# Patient Record
Sex: FEMALE | Race: WHITE | NOT HISPANIC OR LATINO | Employment: FULL TIME | URBAN - METROPOLITAN AREA
[De-identification: names, ages, dates, MRNs, and addresses within clinical notes are randomized per-mention and may not be internally consistent; named-entity substitution may affect disease eponyms.]

---

## 2021-12-10 ENCOUNTER — CLINICAL SUPPORT (OUTPATIENT)
Dept: URGENT CARE | Facility: CLINIC | Age: 31
End: 2021-12-10
Payer: COMMERCIAL

## 2021-12-10 DIAGNOSIS — Z20.822 COVID-19 RULED OUT: Primary | ICD-10-CM

## 2021-12-10 LAB
CTP QC/QA: YES
SARS-COV-2 RDRP RESP QL NAA+PROBE: NEGATIVE

## 2021-12-10 PROCEDURE — U0002 COVID-19 LAB TEST NON-CDC: HCPCS | Mod: QW,S$GLB,, | Performed by: EMERGENCY MEDICINE

## 2021-12-10 PROCEDURE — 99211 OFF/OP EST MAY X REQ PHY/QHP: CPT | Mod: S$GLB,,, | Performed by: EMERGENCY MEDICINE

## 2021-12-10 PROCEDURE — U0002: ICD-10-PCS | Mod: QW,S$GLB,, | Performed by: EMERGENCY MEDICINE

## 2021-12-10 PROCEDURE — 99211 PR OFFICE/OUTPT VISIT, EST, LEVL I: ICD-10-PCS | Mod: S$GLB,,, | Performed by: EMERGENCY MEDICINE

## 2021-12-14 ENCOUNTER — CLINICAL SUPPORT (OUTPATIENT)
Dept: URGENT CARE | Facility: CLINIC | Age: 31
End: 2021-12-14
Payer: COMMERCIAL

## 2021-12-14 ENCOUNTER — CLINICAL SUPPORT (OUTPATIENT)
Dept: URGENT CARE | Facility: CLINIC | Age: 31
End: 2021-12-14

## 2021-12-14 DIAGNOSIS — Z11.52 ENCOUNTER FOR SCREENING FOR SEVERE ACUTE RESPIRATORY SYNDROME CORONAVIRUS 2 (SARS-COV-2) INFECTION: Primary | ICD-10-CM

## 2021-12-14 LAB
CTP QC/QA: YES
SARS-COV-2 RDRP RESP QL NAA+PROBE: NEGATIVE

## 2021-12-14 PROCEDURE — U0002: ICD-10-PCS | Mod: QW,S$GLB,, | Performed by: NURSE PRACTITIONER

## 2021-12-14 PROCEDURE — 99211 OFF/OP EST MAY X REQ PHY/QHP: CPT | Mod: S$GLB,,, | Performed by: NURSE PRACTITIONER

## 2021-12-14 PROCEDURE — 99211 PR OFFICE/OUTPT VISIT, EST, LEVL I: ICD-10-PCS | Mod: S$GLB,,, | Performed by: NURSE PRACTITIONER

## 2021-12-14 PROCEDURE — U0002 COVID-19 LAB TEST NON-CDC: HCPCS | Mod: QW,S$GLB,, | Performed by: NURSE PRACTITIONER

## 2021-12-27 ENCOUNTER — OFFICE VISIT (OUTPATIENT)
Dept: URGENT CARE | Facility: CLINIC | Age: 31
End: 2021-12-27
Payer: COMMERCIAL

## 2021-12-27 VITALS
SYSTOLIC BLOOD PRESSURE: 106 MMHG | WEIGHT: 182 LBS | OXYGEN SATURATION: 97 % | RESPIRATION RATE: 18 BRPM | DIASTOLIC BLOOD PRESSURE: 68 MMHG | HEIGHT: 67 IN | TEMPERATURE: 99 F | BODY MASS INDEX: 28.56 KG/M2 | HEART RATE: 79 BPM

## 2021-12-27 DIAGNOSIS — Z20.822 CLOSE EXPOSURE TO COVID-19 VIRUS: Primary | ICD-10-CM

## 2021-12-27 DIAGNOSIS — R53.83 FATIGUE, UNSPECIFIED TYPE: ICD-10-CM

## 2021-12-27 LAB
CTP QC/QA: YES
SARS-COV-2 RDRP RESP QL NAA+PROBE: NEGATIVE

## 2021-12-27 PROCEDURE — 3078F PR MOST RECENT DIASTOLIC BLOOD PRESSURE < 80 MM HG: ICD-10-PCS | Mod: CPTII,S$GLB,, | Performed by: PHYSICIAN ASSISTANT

## 2021-12-27 PROCEDURE — 3074F SYST BP LT 130 MM HG: CPT | Mod: CPTII,S$GLB,, | Performed by: PHYSICIAN ASSISTANT

## 2021-12-27 PROCEDURE — 3078F DIAST BP <80 MM HG: CPT | Mod: CPTII,S$GLB,, | Performed by: PHYSICIAN ASSISTANT

## 2021-12-27 PROCEDURE — 3008F PR BODY MASS INDEX (BMI) DOCUMENTED: ICD-10-PCS | Mod: CPTII,S$GLB,, | Performed by: PHYSICIAN ASSISTANT

## 2021-12-27 PROCEDURE — 99213 PR OFFICE/OUTPT VISIT, EST, LEVL III, 20-29 MIN: ICD-10-PCS | Mod: S$GLB,,, | Performed by: PHYSICIAN ASSISTANT

## 2021-12-27 PROCEDURE — U0002 COVID-19 LAB TEST NON-CDC: HCPCS | Mod: QW,S$GLB,, | Performed by: PHYSICIAN ASSISTANT

## 2021-12-27 PROCEDURE — 99213 OFFICE O/P EST LOW 20 MIN: CPT | Mod: S$GLB,,, | Performed by: PHYSICIAN ASSISTANT

## 2021-12-27 PROCEDURE — 3074F PR MOST RECENT SYSTOLIC BLOOD PRESSURE < 130 MM HG: ICD-10-PCS | Mod: CPTII,S$GLB,, | Performed by: PHYSICIAN ASSISTANT

## 2021-12-27 PROCEDURE — U0002: ICD-10-PCS | Mod: QW,S$GLB,, | Performed by: PHYSICIAN ASSISTANT

## 2021-12-27 PROCEDURE — 3008F BODY MASS INDEX DOCD: CPT | Mod: CPTII,S$GLB,, | Performed by: PHYSICIAN ASSISTANT

## 2022-08-08 PROBLEM — F31.81 BIPOLAR 2 DISORDER, MAJOR DEPRESSIVE EPISODE: Status: RESOLVED | Noted: 2018-11-28 | Resolved: 2022-08-08

## 2022-08-08 PROBLEM — F31.70 BIPOLAR AFFECTIVE DISORDER IN REMISSION: Status: ACTIVE | Noted: 2022-08-08

## 2022-08-08 PROBLEM — E88.810 METABOLIC SYNDROME X: Status: ACTIVE | Noted: 2021-02-04

## 2022-08-08 PROBLEM — F31.81 BIPOLAR 2 DISORDER, MAJOR DEPRESSIVE EPISODE: Status: ACTIVE | Noted: 2018-11-28

## 2022-08-08 PROBLEM — E78.5 HYPERLIPIDEMIA: Status: ACTIVE | Noted: 2021-02-04

## 2022-08-08 PROBLEM — F90.1 ATTENTION DEFICIT HYPERACTIVITY DISORDER (ADHD), PREDOMINANTLY HYPERACTIVE TYPE: Status: ACTIVE | Noted: 2022-08-08

## 2022-09-01 PROBLEM — F31.70 BIPOLAR AFFECTIVE DISORDER IN REMISSION: Status: RESOLVED | Noted: 2022-08-08 | Resolved: 2022-09-01

## 2022-09-09 ENCOUNTER — OFFICE VISIT (OUTPATIENT)
Dept: CARDIOLOGY | Facility: CLINIC | Age: 32
End: 2022-09-09
Payer: COMMERCIAL

## 2022-09-09 VITALS
HEIGHT: 67 IN | BODY MASS INDEX: 28.72 KG/M2 | SYSTOLIC BLOOD PRESSURE: 120 MMHG | OXYGEN SATURATION: 98 % | WEIGHT: 183 LBS | DIASTOLIC BLOOD PRESSURE: 62 MMHG | HEART RATE: 82 BPM

## 2022-09-09 DIAGNOSIS — K21.9 GASTROESOPHAGEAL REFLUX DISEASE WITHOUT ESOPHAGITIS: ICD-10-CM

## 2022-09-09 DIAGNOSIS — I45.10 INCOMPLETE RBBB: ICD-10-CM

## 2022-09-09 DIAGNOSIS — R07.9 CHEST PAIN, UNSPECIFIED TYPE: ICD-10-CM

## 2022-09-09 DIAGNOSIS — F41.9 ANXIETY: ICD-10-CM

## 2022-09-09 DIAGNOSIS — F90.1 ATTENTION DEFICIT HYPERACTIVITY DISORDER (ADHD), PREDOMINANTLY HYPERACTIVE TYPE: ICD-10-CM

## 2022-09-09 DIAGNOSIS — R94.31 ABNORMAL EKG: Primary | ICD-10-CM

## 2022-09-09 PROCEDURE — 3074F SYST BP LT 130 MM HG: CPT | Mod: CPTII,S$GLB,, | Performed by: STUDENT IN AN ORGANIZED HEALTH CARE EDUCATION/TRAINING PROGRAM

## 2022-09-09 PROCEDURE — 99999 PR PBB SHADOW E&M-EST. PATIENT-LVL III: ICD-10-PCS | Mod: PBBFAC,,, | Performed by: STUDENT IN AN ORGANIZED HEALTH CARE EDUCATION/TRAINING PROGRAM

## 2022-09-09 PROCEDURE — 3008F PR BODY MASS INDEX (BMI) DOCUMENTED: ICD-10-PCS | Mod: CPTII,S$GLB,, | Performed by: STUDENT IN AN ORGANIZED HEALTH CARE EDUCATION/TRAINING PROGRAM

## 2022-09-09 PROCEDURE — 99204 PR OFFICE/OUTPT VISIT, NEW, LEVL IV, 45-59 MIN: ICD-10-PCS | Mod: S$GLB,,, | Performed by: STUDENT IN AN ORGANIZED HEALTH CARE EDUCATION/TRAINING PROGRAM

## 2022-09-09 PROCEDURE — 3074F PR MOST RECENT SYSTOLIC BLOOD PRESSURE < 130 MM HG: ICD-10-PCS | Mod: CPTII,S$GLB,, | Performed by: STUDENT IN AN ORGANIZED HEALTH CARE EDUCATION/TRAINING PROGRAM

## 2022-09-09 PROCEDURE — 3008F BODY MASS INDEX DOCD: CPT | Mod: CPTII,S$GLB,, | Performed by: STUDENT IN AN ORGANIZED HEALTH CARE EDUCATION/TRAINING PROGRAM

## 2022-09-09 PROCEDURE — 3078F DIAST BP <80 MM HG: CPT | Mod: CPTII,S$GLB,, | Performed by: STUDENT IN AN ORGANIZED HEALTH CARE EDUCATION/TRAINING PROGRAM

## 2022-09-09 PROCEDURE — 3044F PR MOST RECENT HEMOGLOBIN A1C LEVEL <7.0%: ICD-10-PCS | Mod: CPTII,S$GLB,, | Performed by: STUDENT IN AN ORGANIZED HEALTH CARE EDUCATION/TRAINING PROGRAM

## 2022-09-09 PROCEDURE — 99999 PR PBB SHADOW E&M-EST. PATIENT-LVL III: CPT | Mod: PBBFAC,,, | Performed by: STUDENT IN AN ORGANIZED HEALTH CARE EDUCATION/TRAINING PROGRAM

## 2022-09-09 PROCEDURE — 3044F HG A1C LEVEL LT 7.0%: CPT | Mod: CPTII,S$GLB,, | Performed by: STUDENT IN AN ORGANIZED HEALTH CARE EDUCATION/TRAINING PROGRAM

## 2022-09-09 PROCEDURE — 3078F PR MOST RECENT DIASTOLIC BLOOD PRESSURE < 80 MM HG: ICD-10-PCS | Mod: CPTII,S$GLB,, | Performed by: STUDENT IN AN ORGANIZED HEALTH CARE EDUCATION/TRAINING PROGRAM

## 2022-09-09 PROCEDURE — 99204 OFFICE O/P NEW MOD 45 MIN: CPT | Mod: S$GLB,,, | Performed by: STUDENT IN AN ORGANIZED HEALTH CARE EDUCATION/TRAINING PROGRAM

## 2022-09-09 NOTE — PROGRESS NOTES
Section of Cardiology                  Cardiac Clinic Note    Chief Complaint/Reason for consultation:  Abnormal EKG      HPI:   Allison Garza is a 32 y.o. female with h/o anxiety, ADHD, depression, metabolic syndrome who was referred to cardiology clinic by PCP Dr. Epstein for evaluation.      2022  Reports chest pain that started 22, 7/10 intensity at the most, intermittent  Self resolved, not consecutive days. Not worse with exertion   Associated with headaches and palpitations   Denies SOB, near syncope, nausea, clammy   Today feeling pressure in her chest, left sided   No changes in her meds  Less stressed than normal   Has been on amphetamines since ,  Rtitalin stopped 2 weeks before her symptoms, made her irritable   Does not exercise regularly, but active  Works from home    Denies tobacco or ETOH abuse  Family hx: dad- HTN, DM, heart disease       EKG 22 NSR, incomplete right bundle-branch block,  ms    ECHO      STRESS TEST    [facility]      ROS: All 10 systems reviewed. Please refer to the HPI for pertinent positives. All other systems negative.     Past Medical History  Past Medical History:   Diagnosis Date    ADHD (attention deficit hyperactivity disorder)     Anxiety     Depression     Thyroid disease        Surgical History  Past Surgical History:   Procedure Laterality Date    ADENOIDECTOMY       SECTION      THYROIDECTOMY, PARTIAL      TONSILLECTOMY            Allergies:   Review of patient's allergies indicates:  No Known Allergies    Social History:  Social History     Socioeconomic History    Marital status:    Tobacco Use    Smoking status: Former     Types: Cigarettes    Smokeless tobacco: Never   Substance and Sexual Activity    Alcohol use: Not Currently    Drug use: Never    Sexual activity: Yes     Partners: Male     Birth control/protection: None       Family History:  family history is not on file.    Home Medications:  Current  "Outpatient Medications on File Prior to Visit   Medication Sig Dispense Refill    lamoTRIgine (LAMICTAL) 25 MG tablet Take 2 tablets by mouth once daily.      omeprazole (PRILOSEC) 40 MG capsule Take 1 capsule (40 mg total) by mouth every morning. 30 capsule 2    sertraline (ZOLOFT) 50 MG tablet 1 tablet      methylphenidate HCl (RITALIN) 10 MG tablet        No current facility-administered medications on file prior to visit.       Physical exam:  /62 (BP Location: Left arm, Patient Position: Sitting, BP Method: Medium (Automatic))   Pulse 82   Ht 5' 7" (1.702 m)   Wt 83 kg (182 lb 15.7 oz)   SpO2 98%   BMI 28.66 kg/m²         General: Pt is a 32 y.o. year old female who is AAOx3, in NAD, is pleasant, well nourished, looks stated age  HEENT: PERRL, EOMI, Oral mucosa pink & moist  CVS  No abnormal cardiac pulsations noted on inspection. JVP not raised. The apical impulse is normal on palpation, and is located in the left 5th intercostal space in the mid - clavicular line. No palpable thrills or abnormal pulsations noted. RR, S1 - S2 heard, no murmurs, rubs or gallops appreciated.   PUL : CTA B/L. No wheezes/crackles heard   ABD : BS +, soft. No tenderness elicited   LE : No C/C/E. Distal Pulses palpable B/L         LABS:    Chemistry:   Lab Results   Component Value Date     08/08/2022    K 4.1 08/08/2022     08/08/2022    CO2 25 08/08/2022    BUN 8 08/08/2022    CREATININE 0.70 08/08/2022    CALCIUM 9.7 08/08/2022     Cardiac Markers: No results found for: CKTOTAL, CKMB, CKMBINDEX, TROPONINI  Cardiac Markers (Last 3): No results found for: CKTOTAL, CKMB, CKMBINDEX, TROPONINI  CBC:   Lab Results   Component Value Date    WBC 7.6 08/08/2022    HGB 14.0 08/08/2022    HCT 41.8 08/08/2022    MCV 90 08/08/2022     08/08/2022     Lipids:   Lab Results   Component Value Date    CHOL 172 08/08/2022    TRIG 76 08/08/2022    HDL 40 08/08/2022     Coagulation: No results found for: PT, INR, " APTT        Assessment        1. Abnormal EKG    2. Incomplete RBBB    3. Attention deficit hyperactivity disorder (ADHD), predominantly hyperactive type    4. Anxiety    5. Gastroesophageal reflux disease without esophagitis         Plan:    Abnormal EKG/chest pain  Incomplete RBBB- nonspecific conduction delay   Obtain echo  Obtain ETT    ADHD  Stable   Currently not taking Ritalin    GERD  Stable   Currently taking Prilosec     Anxiety/depression  Stable   Currently taking sertraline        This note was prepared using voice recognition system and is likely to have sound alike errors that may have been overlooked even after proofreading.     I have reviewed all pertinent chart information.  Plans and recommendations have been formulated under my direct supervision. All questions answered and patient voiced understanding.   If symptoms persist go to the ED.    RTC in 1 month        John Brewer MD  Cardiology

## 2022-09-29 ENCOUNTER — PATIENT MESSAGE (OUTPATIENT)
Dept: CARDIOLOGY | Facility: CLINIC | Age: 32
End: 2022-09-29
Payer: COMMERCIAL

## 2022-10-10 ENCOUNTER — HOSPITAL ENCOUNTER (OUTPATIENT)
Dept: CARDIOLOGY | Facility: HOSPITAL | Age: 32
Discharge: HOME | End: 2022-10-10
Attending: STUDENT IN AN ORGANIZED HEALTH CARE EDUCATION/TRAINING PROGRAM
Payer: COMMERCIAL

## 2022-10-10 VITALS — WEIGHT: 182 LBS | HEIGHT: 67 IN | BODY MASS INDEX: 28.56 KG/M2

## 2022-10-10 DIAGNOSIS — R94.31 ABNORMAL EKG: ICD-10-CM

## 2022-10-10 DIAGNOSIS — I45.10 INCOMPLETE RBBB: ICD-10-CM

## 2022-10-10 DIAGNOSIS — R07.9 CHEST PAIN, UNSPECIFIED TYPE: ICD-10-CM

## 2022-10-10 DIAGNOSIS — F41.9 ANXIETY: ICD-10-CM

## 2022-10-10 DIAGNOSIS — F90.1 ATTENTION DEFICIT HYPERACTIVITY DISORDER (ADHD), PREDOMINANTLY HYPERACTIVE TYPE: ICD-10-CM

## 2022-10-10 DIAGNOSIS — K21.9 GASTROESOPHAGEAL REFLUX DISEASE WITHOUT ESOPHAGITIS: ICD-10-CM

## 2022-10-10 LAB
AV INDEX (PROSTH): 0.84
AV MEAN GRADIENT: 3 MMHG
AV PEAK GRADIENT: 5 MMHG
AV VALVE AREA: 2.65 CM2
AV VELOCITY RATIO: 0.79
BSA FOR ECHO PROCEDURE: 1.98 M2
CV ECHO LV RWT: 0.36 CM
DOP CALC AO PEAK VEL: 1.17 M/S
DOP CALC AO VTI: 23.1 CM
DOP CALC LVOT AREA: 3.2 CM2
DOP CALC LVOT DIAMETER: 2.01 CM
DOP CALC LVOT PEAK VEL: 0.93 M/S
DOP CALC LVOT STROKE VOLUME: 61.21 CM3
DOP CALC RVOT PEAK VEL: 0.79 M/S
DOP CALC RVOT VTI: 16.1 CM
DOP CALCLVOT PEAK VEL VTI: 19.3 CM
ECHO LV POSTERIOR WALL: 0.86 CM (ref 0.6–1.1)
EJECTION FRACTION: 65 %
FRACTIONAL SHORTENING: 44 % (ref 28–44)
INTERVENTRICULAR SEPTUM: 0.96 CM (ref 0.6–1.1)
IVRT: 65.65 MSEC
LA MAJOR: 4.3 CM
LA MINOR: 3.99 CM
LA WIDTH: 4.2 CM
LEFT ATRIUM SIZE: 3.08 CM
LEFT ATRIUM VOLUME INDEX MOD: 19.5 ML/M2
LEFT ATRIUM VOLUME INDEX: 23.5 ML/M2
LEFT ATRIUM VOLUME MOD: 37.88 CM3
LEFT ATRIUM VOLUME: 45.51 CM3
LEFT INTERNAL DIMENSION IN SYSTOLE: 2.7 CM (ref 2.1–4)
LEFT VENTRICLE DIASTOLIC VOLUME INDEX: 55.37 ML/M2
LEFT VENTRICLE DIASTOLIC VOLUME: 107.41 ML
LEFT VENTRICLE MASS INDEX: 77 G/M2
LEFT VENTRICLE SYSTOLIC VOLUME INDEX: 13.9 ML/M2
LEFT VENTRICLE SYSTOLIC VOLUME: 27.02 ML
LEFT VENTRICULAR INTERNAL DIMENSION IN DIASTOLE: 4.8 CM (ref 3.5–6)
LEFT VENTRICULAR MASS: 149.97 G
LVOT MG: 1.83 MMHG
LVOT MV: 0.62 CM/S
PV MEAN GRADIENT: 1.5 MMHG
PV PEAK VELOCITY: 0.98 CM/S
RA MAJOR: 4.73 CM
RA WIDTH: 3.79 CM
RIGHT VENTRICULAR END-DIASTOLIC DIMENSION: 3.52 CM
SINUS: 3.16 CM
STJ: 2.8 CM
TDI LATERAL: 0.17 M/S
TDI SEPTAL: 0.1 M/S
TDI: 0.14 M/S
TRICUSPID ANNULAR PLANE SYSTOLIC EXCURSION: 1.93 CM

## 2022-10-10 PROCEDURE — 93016 CV STRESS TEST SUPVJ ONLY: CPT | Mod: ,,, | Performed by: STUDENT IN AN ORGANIZED HEALTH CARE EDUCATION/TRAINING PROGRAM

## 2022-10-10 PROCEDURE — 93306 TTE W/DOPPLER COMPLETE: CPT | Mod: 26,,, | Performed by: STUDENT IN AN ORGANIZED HEALTH CARE EDUCATION/TRAINING PROGRAM

## 2022-10-10 PROCEDURE — 93018 CV STRESS TEST I&R ONLY: CPT | Mod: ,,, | Performed by: STUDENT IN AN ORGANIZED HEALTH CARE EDUCATION/TRAINING PROGRAM

## 2022-10-10 PROCEDURE — 93306 TTE W/DOPPLER COMPLETE: CPT

## 2022-10-10 PROCEDURE — 93018 EXERCISE STRESS - EKG (CUPID ONLY): ICD-10-PCS | Mod: ,,, | Performed by: STUDENT IN AN ORGANIZED HEALTH CARE EDUCATION/TRAINING PROGRAM

## 2022-10-10 PROCEDURE — 93016 EXERCISE STRESS - EKG (CUPID ONLY): ICD-10-PCS | Mod: ,,, | Performed by: STUDENT IN AN ORGANIZED HEALTH CARE EDUCATION/TRAINING PROGRAM

## 2022-10-10 PROCEDURE — 93306 ECHO (CUPID ONLY): ICD-10-PCS | Mod: 26,,, | Performed by: STUDENT IN AN ORGANIZED HEALTH CARE EDUCATION/TRAINING PROGRAM

## 2022-10-11 LAB
CV STRESS BASE HR: 81 BPM
DIASTOLIC BLOOD PRESSURE: 73 MMHG
OHS CV CPX 1 MINUTE RECOVERY HEART RATE: 136 BPM
OHS CV CPX 85 PERCENT MAX PREDICTED HEART RATE MALE: 150
OHS CV CPX ESTIMATED METS: 10
OHS CV CPX MAX PREDICTED HEART RATE: 176
OHS CV CPX PATIENT IS FEMALE: 1
OHS CV CPX PATIENT IS MALE: 0
OHS CV CPX PEAK DIASTOLIC BLOOD PRESSURE: 63 MMHG
OHS CV CPX PEAK HEAR RATE: 153 BPM
OHS CV CPX PEAK RATE PRESSURE PRODUCT: NORMAL
OHS CV CPX PEAK SYSTOLIC BLOOD PRESSURE: 166 MMHG
OHS CV CPX PERCENT MAX PREDICTED HEART RATE ACHIEVED: 87
OHS CV CPX RATE PRESSURE PRODUCT PRESENTING: 8991
STRESS ECHO POST EXERCISE DUR MIN: 8 MINUTES
STRESS ECHO POST EXERCISE DUR SEC: 3 SECONDS
SYSTOLIC BLOOD PRESSURE: 111 MMHG

## 2022-10-14 ENCOUNTER — OFFICE VISIT (OUTPATIENT)
Dept: CARDIOLOGY | Facility: CLINIC | Age: 32
End: 2022-10-14
Payer: COMMERCIAL

## 2022-10-14 VITALS
OXYGEN SATURATION: 98 % | HEIGHT: 67 IN | DIASTOLIC BLOOD PRESSURE: 70 MMHG | HEART RATE: 66 BPM | WEIGHT: 184.94 LBS | BODY MASS INDEX: 29.03 KG/M2 | SYSTOLIC BLOOD PRESSURE: 110 MMHG

## 2022-10-14 DIAGNOSIS — R94.31 ABNORMAL EKG: ICD-10-CM

## 2022-10-14 DIAGNOSIS — I45.10 INCOMPLETE RBBB: Primary | ICD-10-CM

## 2022-10-14 DIAGNOSIS — F41.9 ANXIETY: ICD-10-CM

## 2022-10-14 DIAGNOSIS — R07.9 CHEST PAIN, UNSPECIFIED TYPE: ICD-10-CM

## 2022-10-14 DIAGNOSIS — F90.1 ATTENTION DEFICIT HYPERACTIVITY DISORDER (ADHD), PREDOMINANTLY HYPERACTIVE TYPE: ICD-10-CM

## 2022-10-14 DIAGNOSIS — K21.9 GASTROESOPHAGEAL REFLUX DISEASE WITHOUT ESOPHAGITIS: ICD-10-CM

## 2022-10-14 PROCEDURE — 99214 PR OFFICE/OUTPT VISIT, EST, LEVL IV, 30-39 MIN: ICD-10-PCS | Mod: S$GLB,,, | Performed by: STUDENT IN AN ORGANIZED HEALTH CARE EDUCATION/TRAINING PROGRAM

## 2022-10-14 PROCEDURE — 99999 PR PBB SHADOW E&M-EST. PATIENT-LVL III: CPT | Mod: PBBFAC,,, | Performed by: STUDENT IN AN ORGANIZED HEALTH CARE EDUCATION/TRAINING PROGRAM

## 2022-10-14 PROCEDURE — 3074F PR MOST RECENT SYSTOLIC BLOOD PRESSURE < 130 MM HG: ICD-10-PCS | Mod: CPTII,S$GLB,, | Performed by: STUDENT IN AN ORGANIZED HEALTH CARE EDUCATION/TRAINING PROGRAM

## 2022-10-14 PROCEDURE — 3074F SYST BP LT 130 MM HG: CPT | Mod: CPTII,S$GLB,, | Performed by: STUDENT IN AN ORGANIZED HEALTH CARE EDUCATION/TRAINING PROGRAM

## 2022-10-14 PROCEDURE — 3044F HG A1C LEVEL LT 7.0%: CPT | Mod: CPTII,S$GLB,, | Performed by: STUDENT IN AN ORGANIZED HEALTH CARE EDUCATION/TRAINING PROGRAM

## 2022-10-14 PROCEDURE — 99999 PR PBB SHADOW E&M-EST. PATIENT-LVL III: ICD-10-PCS | Mod: PBBFAC,,, | Performed by: STUDENT IN AN ORGANIZED HEALTH CARE EDUCATION/TRAINING PROGRAM

## 2022-10-14 PROCEDURE — 1159F MED LIST DOCD IN RCRD: CPT | Mod: CPTII,S$GLB,, | Performed by: STUDENT IN AN ORGANIZED HEALTH CARE EDUCATION/TRAINING PROGRAM

## 2022-10-14 PROCEDURE — 3078F DIAST BP <80 MM HG: CPT | Mod: CPTII,S$GLB,, | Performed by: STUDENT IN AN ORGANIZED HEALTH CARE EDUCATION/TRAINING PROGRAM

## 2022-10-14 PROCEDURE — 3044F PR MOST RECENT HEMOGLOBIN A1C LEVEL <7.0%: ICD-10-PCS | Mod: CPTII,S$GLB,, | Performed by: STUDENT IN AN ORGANIZED HEALTH CARE EDUCATION/TRAINING PROGRAM

## 2022-10-14 PROCEDURE — 1159F PR MEDICATION LIST DOCUMENTED IN MEDICAL RECORD: ICD-10-PCS | Mod: CPTII,S$GLB,, | Performed by: STUDENT IN AN ORGANIZED HEALTH CARE EDUCATION/TRAINING PROGRAM

## 2022-10-14 PROCEDURE — 99214 OFFICE O/P EST MOD 30 MIN: CPT | Mod: S$GLB,,, | Performed by: STUDENT IN AN ORGANIZED HEALTH CARE EDUCATION/TRAINING PROGRAM

## 2022-10-14 PROCEDURE — 3078F PR MOST RECENT DIASTOLIC BLOOD PRESSURE < 80 MM HG: ICD-10-PCS | Mod: CPTII,S$GLB,, | Performed by: STUDENT IN AN ORGANIZED HEALTH CARE EDUCATION/TRAINING PROGRAM

## 2022-10-14 NOTE — PROGRESS NOTES
Section of Cardiology                  Cardiac Clinic Note    Chief Complaint/Reason for consultation:  Abnormal EKG      HPI:   Allison Garza is a 32 y.o. female with h/o anxiety, ADHD, depression, metabolic syndrome who was referred to cardiology clinic by PCP Dr. Epstein for evaluation.      2022  Reports chest pain that started 22, 7/10 intensity at the most, intermittent  Self resolved, not consecutive days. Not worse with exertion   Associated with headaches and palpitations   Denies SOB, near syncope, nausea, clammy   Today feeling pressure in her chest, left sided   No changes in her meds  Less stressed than normal   Has been on amphetamines since ,  Rtitalin stopped 2 weeks before her symptoms, made her irritable   Does not exercise regularly, but active  Works from home    Denies tobacco or ETOH abuse  Family hx: dad- HTN, DM, heart disease       10/14/22  ETT 10/22 w/o ischemia  Echo 10/22 normal EF  Taking prilosec daily  Still gets chest pain, intermittent, intensity has lessened   Wants to increase activity/exercise     Denies SOB, syncope, PND, orthopnea       EKG 22 NSR, incomplete right bundle-branch block,  ms    ECHO  10/22  The left ventricle is normal in size with normal systolic function.  Normal left ventricular diastolic function.  Normal right ventricular size with normal right ventricular systolic function.  The estimated ejection fraction is 65%.    STRESS TEST    [facility]      ROS: All 10 systems reviewed. Please refer to the HPI for pertinent positives. All other systems negative.     Past Medical History  Past Medical History:   Diagnosis Date    ADHD (attention deficit hyperactivity disorder)     Anxiety     Depression     Thyroid disease        Surgical History  Past Surgical History:   Procedure Laterality Date    ADENOIDECTOMY       SECTION      THYROIDECTOMY, PARTIAL      TONSILLECTOMY            Allergies:   Review of patient's  "allergies indicates:  No Known Allergies    Social History:  Social History     Socioeconomic History    Marital status:    Tobacco Use    Smoking status: Former     Types: Cigarettes    Smokeless tobacco: Never   Substance and Sexual Activity    Alcohol use: Not Currently    Drug use: Never    Sexual activity: Yes     Partners: Male     Birth control/protection: None       Family History:  family history is not on file.    Home Medications:  Current Outpatient Medications on File Prior to Visit   Medication Sig Dispense Refill    lamoTRIgine (LAMICTAL) 25 MG tablet Take 2 tablets by mouth once daily.      omeprazole (PRILOSEC) 40 MG capsule Take 1 capsule (40 mg total) by mouth every morning. 30 capsule 2    sertraline (ZOLOFT) 50 MG tablet 1 tablet      methylphenidate HCl (RITALIN) 10 MG tablet        No current facility-administered medications on file prior to visit.       Physical exam:  /70 (BP Location: Left arm, Patient Position: Sitting, BP Method: Medium (Manual))   Pulse 66   Ht 5' 7" (1.702 m)   Wt 83.9 kg (184 lb 15.5 oz)   SpO2 98%   BMI 28.97 kg/m²       General: Pt is a 32 y.o. year old female who is AAOx3, in NAD, is pleasant, well nourished, looks stated age  HEENT: PERRL, EOMI, Oral mucosa pink & moist  CVS  No abnormal cardiac pulsations noted on inspection. JVP not raised. The apical impulse is normal on palpation, and is located in the left 5th intercostal space in the mid - clavicular line. No palpable thrills or abnormal pulsations noted. RR, S1 - S2 heard, no murmurs, rubs or gallops appreciated.   PUL : CTA B/L. No wheezes/crackles heard   ABD : BS +, soft. No tenderness elicited   LE : No C/C/E. Distal Pulses palpable B/L         LABS:    Chemistry:   Lab Results   Component Value Date     08/08/2022    K 4.1 08/08/2022     08/08/2022    CO2 25 08/08/2022    BUN 8 08/08/2022    CREATININE 0.70 08/08/2022    CALCIUM 9.7 08/08/2022     Cardiac Markers: No " results found for: CKTOTAL, CKMB, CKMBINDEX, TROPONINI  Cardiac Markers (Last 3): No results found for: CKTOTAL, CKMB, CKMBINDEX, TROPONINI  CBC:   Lab Results   Component Value Date    WBC 7.6 08/08/2022    HGB 14.0 08/08/2022    HCT 41.8 08/08/2022    MCV 90 08/08/2022     08/08/2022     Lipids:   Lab Results   Component Value Date    CHOL 172 08/08/2022    TRIG 76 08/08/2022    HDL 40 08/08/2022     Coagulation: No results found for: PT, INR, APTT        Assessment    1. Incomplete RBBB    2. Abnormal EKG    3. Attention deficit hyperactivity disorder (ADHD), predominantly hyperactive type    4. Anxiety    5. Gastroesophageal reflux disease without esophagitis    6. Chest pain, unspecified type           Plan:    Abnormal EKG/chest pain  Incomplete RBBB- nonspecific conduction delay   ETT 10/22 w/o ischemia  Echo 10/22 normal EF    ADHD  Stable   Currently not taking Ritalin    GERD  Stable   Currently taking Prilosec     Anxiety/depression  Stable   Currently taking sertraline        This note was prepared using voice recognition system and is likely to have sound alike errors that may have been overlooked even after proofreading.     I have reviewed all pertinent chart information.  Plans and recommendations have been formulated under my direct supervision. All questions answered and patient voiced understanding.   If symptoms persist go to the ED.    RTC shani Brewer MD  Cardiology

## 2023-08-11 PROBLEM — F32.81 PMDD (PREMENSTRUAL DYSPHORIC DISORDER): Status: ACTIVE | Noted: 2023-08-11

## 2023-11-30 ENCOUNTER — PATIENT MESSAGE (OUTPATIENT)
Dept: CARDIOLOGY | Facility: CLINIC | Age: 33
End: 2023-11-30
Payer: COMMERCIAL

## 2023-12-01 ENCOUNTER — OFFICE VISIT (OUTPATIENT)
Dept: CARDIOLOGY | Facility: CLINIC | Age: 33
End: 2023-12-01
Payer: COMMERCIAL

## 2023-12-01 ENCOUNTER — HOSPITAL ENCOUNTER (OUTPATIENT)
Dept: CARDIOLOGY | Facility: HOSPITAL | Age: 33
Discharge: HOME | End: 2023-12-01
Attending: STUDENT IN AN ORGANIZED HEALTH CARE EDUCATION/TRAINING PROGRAM
Payer: COMMERCIAL

## 2023-12-01 VITALS
DIASTOLIC BLOOD PRESSURE: 64 MMHG | WEIGHT: 186.31 LBS | SYSTOLIC BLOOD PRESSURE: 110 MMHG | BODY MASS INDEX: 29.24 KG/M2 | HEART RATE: 69 BPM | HEIGHT: 67 IN

## 2023-12-01 DIAGNOSIS — I45.10 INCOMPLETE RBBB: ICD-10-CM

## 2023-12-01 DIAGNOSIS — R07.9 CHEST PAIN, UNSPECIFIED TYPE: ICD-10-CM

## 2023-12-01 DIAGNOSIS — R07.9 CHEST PAIN, UNSPECIFIED TYPE: Primary | ICD-10-CM

## 2023-12-01 DIAGNOSIS — R94.31 ABNORMAL EKG: ICD-10-CM

## 2023-12-01 DIAGNOSIS — K21.9 GASTROESOPHAGEAL REFLUX DISEASE WITHOUT ESOPHAGITIS: ICD-10-CM

## 2023-12-01 DIAGNOSIS — F90.1 ATTENTION DEFICIT HYPERACTIVITY DISORDER (ADHD), PREDOMINANTLY HYPERACTIVE TYPE: ICD-10-CM

## 2023-12-01 DIAGNOSIS — F41.9 ANXIETY: Primary | ICD-10-CM

## 2023-12-01 PROCEDURE — 93010 ELECTROCARDIOGRAM REPORT: CPT | Mod: ,,, | Performed by: INTERNAL MEDICINE

## 2023-12-01 PROCEDURE — 3044F PR MOST RECENT HEMOGLOBIN A1C LEVEL <7.0%: ICD-10-PCS | Mod: CPTII,S$GLB,, | Performed by: STUDENT IN AN ORGANIZED HEALTH CARE EDUCATION/TRAINING PROGRAM

## 2023-12-01 PROCEDURE — 3074F PR MOST RECENT SYSTOLIC BLOOD PRESSURE < 130 MM HG: ICD-10-PCS | Mod: CPTII,S$GLB,, | Performed by: STUDENT IN AN ORGANIZED HEALTH CARE EDUCATION/TRAINING PROGRAM

## 2023-12-01 PROCEDURE — 3078F PR MOST RECENT DIASTOLIC BLOOD PRESSURE < 80 MM HG: ICD-10-PCS | Mod: CPTII,S$GLB,, | Performed by: STUDENT IN AN ORGANIZED HEALTH CARE EDUCATION/TRAINING PROGRAM

## 2023-12-01 PROCEDURE — 99214 OFFICE O/P EST MOD 30 MIN: CPT | Mod: S$GLB,,, | Performed by: STUDENT IN AN ORGANIZED HEALTH CARE EDUCATION/TRAINING PROGRAM

## 2023-12-01 PROCEDURE — 1159F MED LIST DOCD IN RCRD: CPT | Mod: CPTII,S$GLB,, | Performed by: STUDENT IN AN ORGANIZED HEALTH CARE EDUCATION/TRAINING PROGRAM

## 2023-12-01 PROCEDURE — 93010 EKG 12-LEAD: ICD-10-PCS | Mod: ,,, | Performed by: INTERNAL MEDICINE

## 2023-12-01 PROCEDURE — 99999 PR PBB SHADOW E&M-EST. PATIENT-LVL III: ICD-10-PCS | Mod: PBBFAC,,, | Performed by: STUDENT IN AN ORGANIZED HEALTH CARE EDUCATION/TRAINING PROGRAM

## 2023-12-01 PROCEDURE — 1159F PR MEDICATION LIST DOCUMENTED IN MEDICAL RECORD: ICD-10-PCS | Mod: CPTII,S$GLB,, | Performed by: STUDENT IN AN ORGANIZED HEALTH CARE EDUCATION/TRAINING PROGRAM

## 2023-12-01 PROCEDURE — 93005 ELECTROCARDIOGRAM TRACING: CPT | Mod: PO

## 2023-12-01 PROCEDURE — 3074F SYST BP LT 130 MM HG: CPT | Mod: CPTII,S$GLB,, | Performed by: STUDENT IN AN ORGANIZED HEALTH CARE EDUCATION/TRAINING PROGRAM

## 2023-12-01 PROCEDURE — 3008F BODY MASS INDEX DOCD: CPT | Mod: CPTII,S$GLB,, | Performed by: STUDENT IN AN ORGANIZED HEALTH CARE EDUCATION/TRAINING PROGRAM

## 2023-12-01 PROCEDURE — 99214 PR OFFICE/OUTPT VISIT, EST, LEVL IV, 30-39 MIN: ICD-10-PCS | Mod: S$GLB,,, | Performed by: STUDENT IN AN ORGANIZED HEALTH CARE EDUCATION/TRAINING PROGRAM

## 2023-12-01 PROCEDURE — 99999 PR PBB SHADOW E&M-EST. PATIENT-LVL III: CPT | Mod: PBBFAC,,, | Performed by: STUDENT IN AN ORGANIZED HEALTH CARE EDUCATION/TRAINING PROGRAM

## 2023-12-01 PROCEDURE — 3008F PR BODY MASS INDEX (BMI) DOCUMENTED: ICD-10-PCS | Mod: CPTII,S$GLB,, | Performed by: STUDENT IN AN ORGANIZED HEALTH CARE EDUCATION/TRAINING PROGRAM

## 2023-12-01 PROCEDURE — 3044F HG A1C LEVEL LT 7.0%: CPT | Mod: CPTII,S$GLB,, | Performed by: STUDENT IN AN ORGANIZED HEALTH CARE EDUCATION/TRAINING PROGRAM

## 2023-12-01 PROCEDURE — 3078F DIAST BP <80 MM HG: CPT | Mod: CPTII,S$GLB,, | Performed by: STUDENT IN AN ORGANIZED HEALTH CARE EDUCATION/TRAINING PROGRAM

## 2023-12-01 NOTE — PROGRESS NOTES
Section of Cardiology                  Cardiac Clinic Note    Chief Complaint/Reason for consultation:  Abnormal EKG      HPI:   Allison Garza is a 32 y.o. female with h/o anxiety, ADHD, depression, metabolic syndrome who was referred to cardiology clinic by PCP Dr. Epstein for evaluation.      9/9/2022  Reports chest pain that started 8/21/22, 7/10 intensity at the most, intermittent  Self resolved, not consecutive days. Not worse with exertion   Associated with headaches and palpitations   Denies SOB, near syncope, nausea, clammy   Today feeling pressure in her chest, left sided   No changes in her meds  Less stressed than normal   Has been on amphetamines since 1/22,  Rtitalin stopped 2 weeks before her symptoms, made her irritable   Does not exercise regularly, but active  Works from home    Denies tobacco or ETOH abuse  Family hx: dad- HTN, DM, heart disease       10/14/22  ETT 10/22 w/o ischemia  Echo 10/22 normal EF  Taking prilosec daily  Still gets chest pain, intermittent, intensity has lessened   Wants to increase activity/exercise     Denies SOB, syncope, PND, orthopnea       12/1/23  Stopped having chest pain until now   Has been having chest pain and dizziness, started 4 days ago  Left breast pain  Started menstrual cycle 2 days ago  Dizziness has improved  Chest pain got worse last night, and dull in clinic today  Stopped the prilosec since last year  Diet has been the same  Weight increased 7 lbs since 8/23  Active, works at home, no exercises   Has history of PMDD  Recently stopped zoloft 2 weeks ago (was on it for years)    No SOB, PND, LE swelling         EKG 12/1/23 NSR, incomplete RBBB  EKG 9/1/22 NSR, incomplete right bundle-branch block,  ms    ECHO  10/22  The left ventricle is normal in size with normal systolic function.  Normal left ventricular diastolic function.  Normal right ventricular size with normal right ventricular systolic function.  The estimated  "ejection fraction is 65%.    STRESS TEST    [facility]      ROS: All 10 systems reviewed. Please refer to the HPI for pertinent positives. All other systems negative.     Past Medical History  Past Medical History:   Diagnosis Date    ADHD (attention deficit hyperactivity disorder)     Anxiety     Depression     Thyroid disease        Surgical History  Past Surgical History:   Procedure Laterality Date    ADENOIDECTOMY       SECTION      THYROIDECTOMY, PARTIAL      TONSILLECTOMY            Allergies:   Review of patient's allergies indicates:  No Known Allergies    Social History:  Social History     Socioeconomic History    Marital status:    Tobacco Use    Smoking status: Former     Types: Cigarettes    Smokeless tobacco: Never   Substance and Sexual Activity    Alcohol use: Not Currently    Drug use: Never    Sexual activity: Yes     Partners: Male     Birth control/protection: None       Family History:  family history is not on file.    Home Medications:  Current Outpatient Medications on File Prior to Visit   Medication Sig Dispense Refill    lamoTRIgine (LAMICTAL) 25 MG tablet Take 2 tablets by mouth once daily.      omeprazole (PRILOSEC) 40 MG capsule Take 1 capsule (40 mg total) by mouth every morning. 30 capsule 2    sertraline (ZOLOFT) 50 MG tablet 1 tablet      methylphenidate HCl (RITALIN) 10 MG tablet        No current facility-administered medications on file prior to visit.       Physical exam:  /70 (BP Location: Left arm, Patient Position: Sitting, BP Method: Medium (Manual))   Pulse 66   Ht 5' 7" (1.702 m)   Wt 83.9 kg (184 lb 15.5 oz)   SpO2 98%   BMI 28.97 kg/m²       General: Pt is a 32 y.o. year old female who is AAOx3, in NAD, is pleasant, well nourished, looks stated age  HEENT: PERRL, EOMI, Oral mucosa pink & moist  CVS  No abnormal cardiac pulsations noted on inspection. JVP not raised. The apical impulse is normal on palpation, and is located in the left 5th intercostal " space in the mid - clavicular line. No palpable thrills or abnormal pulsations noted. RR, S1 - S2 heard, no murmurs, rubs or gallops appreciated.   PUL : CTA B/L. No wheezes/crackles heard   ABD : BS +, soft. No tenderness elicited   LE : No C/C/E. Distal Pulses palpable B/L         LABS:    Chemistry:   Lab Results   Component Value Date     08/08/2022    K 4.1 08/08/2022     08/08/2022    CO2 25 08/08/2022    BUN 8 08/08/2022    CREATININE 0.70 08/08/2022    CALCIUM 9.7 08/08/2022     Cardiac Markers: No results found for: CKTOTAL, CKMB, CKMBINDEX, TROPONINI  Cardiac Markers (Last 3): No results found for: CKTOTAL, CKMB, CKMBINDEX, TROPONINI  CBC:   Lab Results   Component Value Date    WBC 7.6 08/08/2022    HGB 14.0 08/08/2022    HCT 41.8 08/08/2022    MCV 90 08/08/2022     08/08/2022     Lipids:   Lab Results   Component Value Date    CHOL 172 08/08/2022    TRIG 76 08/08/2022    HDL 40 08/08/2022     Coagulation: No results found for: PT, INR, APTT        Assessment    1. Incomplete RBBB    2. Abnormal EKG    3. Attention deficit hyperactivity disorder (ADHD), predominantly hyperactive type    4. Anxiety    5. Gastroesophageal reflux disease without esophagitis    6. Chest pain, unspecified type           Plan:    Abnormal EKG/chest pain  Incomplete RBBB- stable   Chest pain, atypical, most likely 2/2 recently stopped zoloft and has PMDD  ETT 10/22 w/o ischemia  Echo 10/22 normal EF    ADHD  Stable   Currently not on on medications     GERD  Stable   Currently not on on medications     Anxiety/depression  Stable   No longer on zoloft         This note was prepared using voice recognition system and is likely to have sound alike errors that may have been overlooked even after proofreading.     I have reviewed all pertinent chart information.  Plans and recommendations have been formulated under my direct supervision. All questions answered and patient voiced understanding.   If symptoms persist  go to the ED.    RTC 1 month         John Brewer MD  Cardiology

## 2023-12-11 ENCOUNTER — ON-DEMAND VIRTUAL (OUTPATIENT)
Dept: URGENT CARE | Facility: CLINIC | Age: 33
End: 2023-12-11
Payer: COMMERCIAL

## 2023-12-11 DIAGNOSIS — J02.0 STREP PHARYNGITIS: Primary | ICD-10-CM

## 2023-12-11 PROCEDURE — 99213 OFFICE O/P EST LOW 20 MIN: CPT | Mod: 95,,, | Performed by: FAMILY MEDICINE

## 2023-12-11 PROCEDURE — 99213 PR OFFICE/OUTPT VISIT, EST, LEVL III, 20-29 MIN: ICD-10-PCS | Mod: 95,,, | Performed by: FAMILY MEDICINE

## 2023-12-11 RX ORDER — AMOXICILLIN 500 MG/1
500 TABLET, FILM COATED ORAL EVERY 12 HOURS
Qty: 20 TABLET | Refills: 0 | Status: SHIPPED | OUTPATIENT
Start: 2023-12-11 | End: 2023-12-21

## 2023-12-11 NOTE — PROGRESS NOTES
Subjective:      Patient ID: Allison Garza is a 33 y.o. female.    Vitals:  vitals were not taken for this visit.     Chief Complaint: Sore Throat      Visit Type: TELE AUDIOVISUAL    Present with the patient at the time of consultation: TELEMED PRESENT WITH PATIENT: None    Past Medical History:   Diagnosis Date    ADHD (attention deficit hyperactivity disorder)     Anxiety     Depression     Thyroid disease      Past Surgical History:   Procedure Laterality Date    ADENOIDECTOMY       SECTION      THYROIDECTOMY, PARTIAL      TONSILLECTOMY       Review of patient's allergies indicates:  No Known Allergies  Current Outpatient Medications on File Prior to Visit   Medication Sig Dispense Refill    [DISCONTINUED] atomoxetine (STRATTERA) 100 mg capsule Take 100 mg by mouth once daily.      [DISCONTINUED] lamoTRIgine (LAMICTAL) 25 MG tablet Take 2 tablets by mouth once daily.      [DISCONTINUED] omeprazole (PRILOSEC) 40 MG capsule Take 1 capsule (40 mg total) by mouth every morning. (Patient not taking: Reported on 2023) 30 capsule 2    [DISCONTINUED] sertraline (ZOLOFT) 100 MG tablet Take 100 mg by mouth.       No current facility-administered medications on file prior to visit.     History reviewed. No pertinent family history.    Medications Ordered                [id number] - [place] - [address] [facility]   [address] [facility] [address]    Telephone: [phone]   Fax: [phone]   Hours: Not open 24 hours                         E-Prescribed (1 of 1)              amoxicillin (AMOXIL) 500 MG Tab    Sig: Take 1 tablet (500 mg total) by mouth every 12 (twelve) hours. for 10 days       Start: 23     Quantity: 20 tablet Refills: 0                           Ohs Peq Odvv Intake    2023  5:42 AM CST - Filed by Patient   Describe your reason for todays visit Sore throat  evening. Worsening throughout night. 6 yo twin daughters home with step (diagnosed thursday  for one and saturday for the other). Would like RX of antibiotics for self.   What is your current physical address in the event of a medical emergency? [address] [place]   Are you able to take your vital signs? Yes   Systolic Blood Pressure:    Diastolic Blood Pressure:    Weight: 185   Height: 67   Pulse:    Temperature:    Respiration rate:    Pulse Oxygen:    Please attach any relevant images or files          34 yo with sore throat that started yesterday. Both 7 yo twins with strept diagnosed after visit to urgent cares..  No fever.  No cough.  No difficulty handling oral secretions, although it hurts to swallow.      ROS     Objective:   The physical exam was conducted virtually.  Physical Exam   Constitutional: She is oriented to person, place, and time. She appears well-developed.  Non-toxic appearance. She does not appear ill. No distress.   HENT:   Head: Normocephalic and atraumatic.   Ears:   Right Ear: External ear normal.   Left Ear: External ear normal.   Mouth/Throat:      Comments: Throat was difficult to visualize via video, although no apparent evolving peritonsillar abscess.  Uvula appears to be midline.  Pulmonary/Chest: Effort normal. No stridor. No respiratory distress.   Abdominal: Normal appearance.   Lymphadenopathy:     She has cervical adenopathy.   Neurological: She is alert and oriented to person, place, and time.   Skin: Skin is not diaphoretic.   Psychiatric: Her behavior is normal. Thought content normal.   Nursing note and vitals reviewed.      Assessment:     1. Strep pharyngitis        Plan:       Strep pharyngitis  -     amoxicillin (AMOXIL) 500 MG Tab; Take 1 tablet (500 mg total) by mouth every 12 (twelve) hours. for 10 days  Dispense: 20 tablet; Refill: 0      Make sure that you follow up with your primary care doctor in the next 2-5 days if needed .  Go to or return to Urgent Care if signs or symptoms change and certainly if you have worsening and/or severe  symptoms go to the nearest emergency department for further evaluation.

## 2023-12-17 ENCOUNTER — OFFICE VISIT (OUTPATIENT)
Dept: URGENT CARE | Facility: CLINIC | Age: 33
End: 2023-12-17
Payer: COMMERCIAL

## 2023-12-17 VITALS
DIASTOLIC BLOOD PRESSURE: 61 MMHG | RESPIRATION RATE: 16 BRPM | TEMPERATURE: 100 F | WEIGHT: 184.44 LBS | BODY MASS INDEX: 27.32 KG/M2 | SYSTOLIC BLOOD PRESSURE: 108 MMHG | HEIGHT: 69 IN | OXYGEN SATURATION: 99 % | HEART RATE: 97 BPM

## 2023-12-17 DIAGNOSIS — R09.82 POSTNASAL DISCHARGE: ICD-10-CM

## 2023-12-17 DIAGNOSIS — J10.1 INFLUENZA B: Primary | ICD-10-CM

## 2023-12-17 DIAGNOSIS — R50.9 FEVER, UNSPECIFIED FEVER CAUSE: ICD-10-CM

## 2023-12-17 LAB
CTP QC/QA: YES
POC MOLECULAR INFLUENZA A AGN: NEGATIVE
POC MOLECULAR INFLUENZA B AGN: POSITIVE

## 2023-12-17 PROCEDURE — 87502 INFLUENZA DNA AMP PROBE: CPT | Mod: QW,S$GLB,,

## 2023-12-17 PROCEDURE — 99213 PR OFFICE/OUTPT VISIT, EST, LEVL III, 20-29 MIN: ICD-10-PCS | Mod: S$GLB,,,

## 2023-12-17 PROCEDURE — 99213 OFFICE O/P EST LOW 20 MIN: CPT | Mod: S$GLB,,,

## 2023-12-17 PROCEDURE — 87502 POCT INFLUENZA A/B MOLECULAR: ICD-10-PCS | Mod: QW,S$GLB,,

## 2023-12-17 RX ORDER — OSELTAMIVIR PHOSPHATE 75 MG/1
75 CAPSULE ORAL 2 TIMES DAILY
Qty: 10 CAPSULE | Refills: 0 | Status: SHIPPED | OUTPATIENT
Start: 2023-12-17 | End: 2023-12-22

## 2023-12-17 RX ORDER — CETIRIZINE HYDROCHLORIDE 10 MG/1
10 TABLET ORAL DAILY
Qty: 10 TABLET | Refills: 0 | Status: SHIPPED | OUTPATIENT
Start: 2023-12-17 | End: 2023-12-27

## 2023-12-17 RX ORDER — FLUTICASONE PROPIONATE 50 MCG
1 SPRAY, SUSPENSION (ML) NASAL DAILY
Qty: 16 ML | Refills: 0 | Status: SHIPPED | OUTPATIENT
Start: 2023-12-17

## 2023-12-17 NOTE — PROGRESS NOTES
"Subjective:      Patient ID: Allison Garza is a 33 y.o. female.    Vitals:  height is 5' 8.5" (1.74 m) and weight is 83.7 kg (184 lb 6.6 oz). Her tympanic temperature is 100.4 °F (38 °C) (abnormal). Her blood pressure is 108/61 and her pulse is 97. Her respiration is 16 and oxygen saturation is 99%.     Chief Complaint: Generalized Body Aches    Pt is here today with generalized body aches, chest pain and low grade fever since yesterday. She states she has been treated for strep since this past Tuesday and is still on antibiotics for that. She rates her body aches 5/10. She has done rest and hydration with no relief.    Other  This is a new problem. The current episode started yesterday. The problem occurs constantly. The problem has been gradually worsening. Associated symptoms include abdominal pain, chest pain, chills, congestion, coughing, diaphoresis, fatigue, headaches, myalgias, neck pain, swollen glands and weakness. Pertinent negatives include no anorexia, arthralgias, change in bowel habit, fever, joint swelling, nausea, numbness, rash, sore throat, urinary symptoms, vertigo, visual change or vomiting. Nothing aggravates the symptoms. She has tried sleep (Amoxicilin, hydrating) for the symptoms. The treatment provided no relief.       Constitution: Positive for chills, sweating and fatigue. Negative for fever.   HENT:  Positive for congestion. Negative for sore throat.    Neck: Positive for neck pain.   Cardiovascular:  Positive for chest pain.   Respiratory:  Positive for cough.    Gastrointestinal:  Positive for abdominal pain. Negative for nausea and vomiting.   Musculoskeletal:  Positive for muscle ache. Negative for joint pain and joint swelling.   Skin:  Negative for rash.   Neurological:  Positive for headaches. Negative for history of vertigo and numbness.      Objective:     Physical Exam   Constitutional: She is oriented to person, place, and time. She appears ill.   HENT:   Head: " Normocephalic and atraumatic.   Ears:   Right Ear: Tympanic membrane, external ear and ear canal normal.   Left Ear: Tympanic membrane, external ear and ear canal normal.   Nose: Nose normal.   Mouth/Throat: Mucous membranes are moist.      Comments: Postnasal drainage   Eyes: Pupils are equal, round, and reactive to light.   Neck: Neck supple.   Cardiovascular: Normal rate, regular rhythm, normal heart sounds and normal pulses.   Pulmonary/Chest: Effort normal and breath sounds normal.   Musculoskeletal: Normal range of motion.         General: Normal range of motion.   Neurological: no focal deficit. She is alert and oriented to person, place, and time.   Skin: Skin is warm and dry. Capillary refill takes less than 2 seconds.   Psychiatric: Mood normal.       Assessment:     1. Influenza B    2. Fever, unspecified fever cause    3. Postnasal discharge          Plan:     Patient Instructions   You have been diagnosed with Influenza.   You are contagious for usually the first 4 days of symptoms and for 24 hours after your last fever.  Please drink plenty of fluids.  Please get plenty of rest.  Please return here or go to the Emergency Department for any concerns or worsening of condition.  If an antiviral prescription such as Tamiflu or Xofluza has been discussed and if prescribed, please take to completion unless you cannot tolerate the side effects.   If you were prescribed a narcotic medication, do not drive or operate heavy equipment or machinery while taking these medications.  If you were given a steroid shot in the clinic and have also been given a prescription for a steroid such as Prednisone or a Medrol Dose Pack, please begin taking them tomorrow.  Take tylenol (acetominophen) for fever, chills or body aches every 4 hours. do not exceed 4000 mg/ day.  OR you may take Motrin (Ibuprofen) 600mg every 6 hours for fever, chills, pain or inflammation.  Cough and Cold Medications as needed. Use an antihistmine  such as claritin or zyrtec to dry you out if you have a runny nose or postnasal drip. Use pseudoephedrine (behind the counter) to decongest (be aware this can raise your blood pressure and may cause palpitations. Delsym or Robitussin for cough. Use mucinex (guaifenisin) to break up mucous. Mucinex DM is helpful if you are congested and have a cough.         Influenza B  -     oseltamivir (TAMIFLU) 75 MG capsule; Take 1 capsule (75 mg total) by mouth 2 (two) times daily. for 5 days  Dispense: 10 capsule; Refill: 0    Fever, unspecified fever cause  -     POCT Influenza A/B MOLECULAR    Postnasal discharge  -     fluticasone propionate (FLONASE) 50 mcg/actuation nasal spray; 1 spray (50 mcg total) by Each Nostril route once daily.  Dispense: 16 mL; Refill: 0  -     cetirizine (ZYRTEC) 10 MG tablet; Take 1 tablet (10 mg total) by mouth once daily. for 10 days  Dispense: 10 tablet; Refill: 0

## 2023-12-17 NOTE — PATIENT INSTRUCTIONS
You have been diagnosed with Influenza.   You are contagious for usually the first 4 days of symptoms and for 24 hours after your last fever.  Please drink plenty of fluids.  Please get plenty of rest.  Please return here or go to the Emergency Department for any concerns or worsening of condition.  If an antiviral prescription such as Tamiflu or Xofluza has been discussed and if prescribed, please take to completion unless you cannot tolerate the side effects.   If you were prescribed a narcotic medication, do not drive or operate heavy equipment or machinery while taking these medications.  If you were given a steroid shot in the clinic and have also been given a prescription for a steroid such as Prednisone or a Medrol Dose Pack, please begin taking them tomorrow.  Take tylenol (acetominophen) for fever, chills or body aches every 4 hours. do not exceed 4000 mg/ day.  OR you may take Motrin (Ibuprofen) 600mg every 6 hours for fever, chills, pain or inflammation.  Cough and Cold Medications as needed. Use an antihistmine such as claritin or zyrtec to dry you out if you have a runny nose or postnasal drip. Use pseudoephedrine (behind the counter) to decongest (be aware this can raise your blood pressure and may cause palpitations. Delsym or Robitussin for cough. Use mucinex (guaifenisin) to break up mucous. Mucinex DM is helpful if you are congested and have a cough.